# Patient Record
Sex: FEMALE | Race: WHITE
[De-identification: names, ages, dates, MRNs, and addresses within clinical notes are randomized per-mention and may not be internally consistent; named-entity substitution may affect disease eponyms.]

---

## 2017-01-13 ENCOUNTER — HOSPITAL ENCOUNTER (OUTPATIENT)
Dept: HOSPITAL 22 - RAD | Age: 27
End: 2017-01-13
Attending: OBSTETRICS & GYNECOLOGY
Payer: COMMERCIAL

## 2017-01-13 DIAGNOSIS — Z36: Primary | ICD-10-CM

## 2017-01-14 NOTE — RADIOLOGY REPORT PS360
US PREG COMP
 
INDICATION: 20 WEEK ANATOMICAL SURVEY
TECHNIQUE: Fetal ultrasound transabdominal scanning/  MW
 
COMPARISON: No previous relevant studies
 
FINDINGS 
 
Single viable intrauterine gestation. 
Variable position. Was breech at the end of today's study.
 
Posterior placenta which extends to the fundus. The cervix appears satisfactory.
 Long, closed and over 4 .2 cm length
 
Complete Fetal survey performed and was unremarkable on the submitted images as 
in PACS.No discrete anomalies identified on fetal survey imaging by technologist
  Active fetus. Three-vessel cord  with satisfactory umbilical cord insertion. .
Survey of fetal brain & ventricles unremarkable. Posterior fossa survey
included and unremarkable
Face and neck survey unremarkable. Nasion. lips. Orbits overall satisfactory on
today's survey
Diaphragm & views chest unremarkable. Four-chamber heart are visualized
satisfactory. Additional cine loop of the four-chamber heart included. LVOT of
imaged
Fetal abdomen: Both kidneys noted & unremarkable. Stomach noted &
satisfactory. Bladder identified
Fetal spine: Survey of the spine satisfactory with no  anomalies identified nor
imaged 
Both arms and legs noted. Most likely female fetus
Amniotic fluid.-. Normal to upper normal amount.       
Maternal adnexa  -no gross incidental findings encountered.
 
Fetal measurements:. 
Average ultrasound age 21 weeks 0 days.
Gestational age 20 weeks 5 days.
 
BPD = 21 week 1 day
OFD = 21 week 2 day
HC = 20 week 4 day
AC = 20 week 6 day
FL = 21 week 0 day
Heart rate = 144 BPM.
 
Cerebellum = 21 week 2 day
humerus = 21 week 4 day
 
IMPRESSION: 
 
Single viable intrauterine gestation in variable position. 
Breech presentation at end of today's exam 
 
21 week 0 dayaverage ultrasound age with today's measurements
 
Posterior placenta extends to the fundus.
 
 Active fetus. Normal/Unremarkable anatomical survey
 Amniotic fluid  -Normal/upper normal amount

## 2020-07-30 ENCOUNTER — HOSPITAL ENCOUNTER (OUTPATIENT)
Age: 30
End: 2020-07-30
Payer: COMMERCIAL

## 2020-07-30 DIAGNOSIS — R68.82: ICD-10-CM

## 2020-07-30 DIAGNOSIS — F41.9: ICD-10-CM

## 2020-07-30 DIAGNOSIS — R09.89: ICD-10-CM

## 2020-07-30 DIAGNOSIS — F32.9: Primary | ICD-10-CM

## 2020-07-30 LAB — TSH SERPL-ACNC: 3.66 UIU/ML (ref 0.47–4.68)

## 2020-07-30 PROCEDURE — 84402 ASSAY OF FREE TESTOSTERONE: CPT

## 2020-07-30 PROCEDURE — 83002 ASSAY OF GONADOTROPIN (LH): CPT

## 2020-07-30 PROCEDURE — 84403 ASSAY OF TOTAL TESTOSTERONE: CPT

## 2020-07-30 PROCEDURE — 36415 COLL VENOUS BLD VENIPUNCTURE: CPT

## 2020-07-30 PROCEDURE — 84443 ASSAY THYROID STIM HORMONE: CPT

## 2020-07-30 PROCEDURE — 83001 ASSAY OF GONADOTROPIN (FSH): CPT

## 2020-08-01 LAB
FSH: 1.5 MIU/ML
LH: 0.9 MIU/ML

## 2020-08-03 LAB — TESTOSTERONE, TOTAL, LC/MS: 27.5 NG/DL (ref 10–55)

## 2020-08-05 LAB
TESTOST FREE SERPL-SCNC: <0.2 PG/ML (ref 0–4.2)
TESTOSTERONE, TOTAL, LC/MS: 26.4 NG/DL (ref 10–55)

## 2021-01-21 ENCOUNTER — HOSPITAL ENCOUNTER (OUTPATIENT)
Age: 31
End: 2021-01-21
Payer: COMMERCIAL

## 2021-01-21 DIAGNOSIS — Z20.822: Primary | ICD-10-CM

## 2021-01-21 LAB
HCT VFR BLD CALC: 39.9 % (ref 37–47)
HGB BLD-MCNC: 12.8 G/DL (ref 12.2–16.2)
MCHC RBC-ENTMCNC: 32 G/DL (ref 31.8–35.4)
MCV RBC: 89 FL (ref 81–99)
MEAN CORPUSCULAR HEMOGLOBIN: 28.5 PG (ref 27–31.2)
PLATELET # BLD: 302 K/MM3 (ref 142–424)
RBC # BLD AUTO: 4.49 M/MM3 (ref 4.2–5.4)
WBC # BLD AUTO: 6.4 K/MM3 (ref 4.8–10.8)

## 2021-01-21 PROCEDURE — 36415 COLL VENOUS BLD VENIPUNCTURE: CPT

## 2021-01-21 PROCEDURE — U0004 COV-19 TEST NON-CDC HGH THRU: HCPCS

## 2021-01-21 PROCEDURE — 87430 STREP A AG IA: CPT

## 2021-01-21 PROCEDURE — 85025 COMPLETE CBC W/AUTO DIFF WBC: CPT

## 2021-01-23 LAB — COVID-19 NASAL PCR SENDOUT P&C: NEGATIVE

## 2021-03-02 ENCOUNTER — TELEHEALTH PROVIDED OTHER THAN IN PATIENT'S HOME (OUTPATIENT)
Dept: URBAN - METROPOLITAN AREA TELEHEALTH 1 | Facility: TELEHEALTH | Age: 31
End: 2021-03-02

## 2021-03-02 DIAGNOSIS — K64.4 RESIDUAL HEMORRHOIDAL SKIN TAGS: ICD-10-CM

## 2021-03-02 PROCEDURE — 99202 OFFICE O/P NEW SF 15 MIN: CPT | Mod: 95 | Performed by: INTERNAL MEDICINE

## 2021-06-08 ENCOUNTER — HOSPITAL ENCOUNTER (OUTPATIENT)
Age: 31
End: 2021-06-08
Payer: COMMERCIAL

## 2021-06-08 DIAGNOSIS — R42: ICD-10-CM

## 2021-06-08 DIAGNOSIS — R06.02: Primary | ICD-10-CM

## 2021-06-08 LAB
ALBUMIN LEVEL: 3.9 G/DL (ref 3.5–5)
ALBUMIN/GLOB SERPL: 1.1 {RATIO} (ref 1.1–1.8)
ALP ISO SERPL-ACNC: 93 U/L (ref 38–126)
ALT SERPLBLD-CCNC: 15 U/L (ref 12–78)
ANION GAP SERPL CALC-SCNC: 9.8 MEQ/L (ref 5–15)
AST SERPL QL: 34 U/L (ref 14–36)
BILIRUBIN,TOTAL: 0.8 MG/DL (ref 0.2–1.3)
BUN SERPL-MCNC: 11 MG/DL (ref 7–17)
CALCIUM SPEC-MCNC: 8.6 MG/DL (ref 8.4–10.2)
CHLORIDE SPEC-SCNC: 101 MMOL/L (ref 98–107)
CO2 SERPL-SCNC: 27 MMOL/L (ref 22–30)
CREAT BLD-SCNC: 0.8 MG/DL (ref 0.52–1.04)
ESTIMATED GLOMERULAR FILT RATE: 85 ML/MIN (ref 60–?)
GFR (AFRICAN AMERICAN): 103 ML/MIN (ref 60–?)
GLOBULIN SER CALC-MCNC: 3.7 G/DL (ref 1.3–3.2)
GLUCOSE: 110 MG/DL (ref 74–100)
HCT VFR BLD CALC: 39.3 % (ref 37–47)
HGB BLD-MCNC: 13.5 G/DL (ref 12.2–16.2)
MCHC RBC-ENTMCNC: 34.3 G/DL (ref 31.8–35.4)
MCV RBC: 83.6 FL (ref 81–99)
MEAN CORPUSCULAR HEMOGLOBIN: 28.7 PG (ref 27–31.2)
PLATELET # BLD: 167 K/MM3 (ref 142–424)
POTASSIUM: 3.8 MMOL/L (ref 3.5–5.1)
PROT SERPL-MCNC: 7.6 G/DL (ref 6.3–8.2)
RBC # BLD AUTO: 4.7 M/MM3 (ref 4.2–5.4)
SODIUM SPEC-SCNC: 134 MMOL/L (ref 136–145)
WBC # BLD AUTO: 5.6 K/MM3 (ref 4.8–10.8)

## 2021-06-08 PROCEDURE — 71046 X-RAY EXAM CHEST 2 VIEWS: CPT

## 2021-06-08 PROCEDURE — 85025 COMPLETE CBC W/AUTO DIFF WBC: CPT

## 2021-06-08 PROCEDURE — 80053 COMPREHEN METABOLIC PANEL: CPT

## 2021-06-08 PROCEDURE — 36415 COLL VENOUS BLD VENIPUNCTURE: CPT

## 2022-08-16 ENCOUNTER — HOSPITAL ENCOUNTER (OUTPATIENT)
Age: 32
End: 2022-08-16
Payer: COMMERCIAL

## 2022-08-16 DIAGNOSIS — E04.1: Primary | ICD-10-CM

## 2022-08-16 PROCEDURE — 76536 US EXAM OF HEAD AND NECK: CPT

## 2022-08-24 ENCOUNTER — HOSPITAL ENCOUNTER (OUTPATIENT)
Age: 32
End: 2022-08-24
Payer: COMMERCIAL

## 2022-08-24 DIAGNOSIS — E06.9: Primary | ICD-10-CM

## 2022-08-24 LAB — TSH SERPL-ACNC: 8.93 UIU/ML (ref 0.47–4.68)

## 2022-08-24 PROCEDURE — 84443 ASSAY THYROID STIM HORMONE: CPT

## 2022-08-24 PROCEDURE — 36415 COLL VENOUS BLD VENIPUNCTURE: CPT

## 2022-08-24 PROCEDURE — 86376 MICROSOMAL ANTIBODY EACH: CPT

## 2022-12-14 ENCOUNTER — HOSPITAL ENCOUNTER (OUTPATIENT)
Age: 32
End: 2022-12-14
Payer: COMMERCIAL

## 2022-12-14 DIAGNOSIS — E06.9: Primary | ICD-10-CM

## 2022-12-14 LAB — TSH SERPL-ACNC: 1.36 UIU/ML (ref 0.47–4.68)

## 2022-12-14 PROCEDURE — 84443 ASSAY THYROID STIM HORMONE: CPT

## 2022-12-14 PROCEDURE — 36415 COLL VENOUS BLD VENIPUNCTURE: CPT

## 2023-06-22 ENCOUNTER — HOSPITAL ENCOUNTER (OUTPATIENT)
Age: 33
End: 2023-06-22
Payer: COMMERCIAL

## 2023-06-22 DIAGNOSIS — E16.2: Primary | ICD-10-CM

## 2023-06-22 LAB
GLUCOSE 1H P GLC SERPL-MCNC: 134 MG/DL (ref 74–100)
GLUCOSE 2H P GLC SERPL-MCNC: 73 MG/DL (ref 74–100)
GLUCOSE P FAST SERPL-MCNC: 92 MG/DL (ref 74–100)

## 2023-06-22 PROCEDURE — 36415 COLL VENOUS BLD VENIPUNCTURE: CPT

## 2023-06-22 PROCEDURE — 82951 GLUCOSE TOLERANCE TEST (GTT): CPT

## 2023-07-26 ENCOUNTER — HOSPITAL ENCOUNTER (OUTPATIENT)
Age: 33
End: 2023-07-26
Payer: COMMERCIAL

## 2023-07-26 DIAGNOSIS — M25.562: Primary | ICD-10-CM

## 2023-07-26 PROCEDURE — 73562 X-RAY EXAM OF KNEE 3: CPT
